# Patient Record
Sex: FEMALE | Race: WHITE | Employment: FULL TIME | URBAN - NONMETROPOLITAN AREA
[De-identification: names, ages, dates, MRNs, and addresses within clinical notes are randomized per-mention and may not be internally consistent; named-entity substitution may affect disease eponyms.]

---

## 2018-06-05 ENCOUNTER — HOSPITAL ENCOUNTER (OUTPATIENT)
Age: 28
Discharge: HOME | End: 2018-06-05
Payer: COMMERCIAL

## 2018-06-05 ENCOUNTER — HOSPITAL ENCOUNTER (OUTPATIENT)
Age: 28
End: 2018-06-05
Payer: COMMERCIAL

## 2018-06-05 LAB — RUBELLA: 302.2 IU/ML

## 2018-06-05 PROCEDURE — 86480 TB TEST CELL IMMUN MEASURE: CPT

## 2018-06-05 PROCEDURE — 36415 COLL VENOUS BLD VENIPUNCTURE: CPT

## 2018-06-05 PROCEDURE — 86765 RUBEOLA ANTIBODY: CPT

## 2018-06-05 PROCEDURE — 86706 HEP B SURFACE ANTIBODY: CPT

## 2018-06-05 PROCEDURE — 86762 RUBELLA ANTIBODY: CPT

## 2018-06-05 PROCEDURE — 86592 SYPHILIS TEST NON-TREP QUAL: CPT

## 2018-06-05 PROCEDURE — 86787 VARICELLA-ZOSTER ANTIBODY: CPT

## 2018-06-05 PROCEDURE — 86735 MUMPS ANTIBODY: CPT

## 2018-06-06 LAB
HBV SURFACE AB TITR SER: POSITIVE {TITER}
RPR: NONREACTIVE

## 2018-06-08 LAB
RUBEOLA IGM: 0.16 AU (ref 0–0.79)
VZV IGG SER QL IA: 1.94

## 2018-06-09 LAB
MUMPS IGM TITER: NORMAL
QUANTIFERON (R) TB GOLD (INCUBATED): NEGATIVE
QUANTIFERON MITOGEN MINUS NIL: > 10 IU/ML
QUANTIFERON NIL: 0.01 IU/ML
QUANTIFERON TB AG MINUS NIL: 0.05 IU/ML (ref 0–0.34)

## 2020-12-31 ENCOUNTER — HOSPITAL ENCOUNTER (OUTPATIENT)
Dept: HOSPITAL 62 - EMPHEALTH | Age: 30
End: 2020-12-31
Attending: INTERNAL MEDICINE
Payer: SELF-PAY

## 2020-12-31 DIAGNOSIS — Z23: Primary | ICD-10-CM

## 2020-12-31 PROCEDURE — 91300: CPT

## 2021-01-21 ENCOUNTER — HOSPITAL ENCOUNTER (OUTPATIENT)
Dept: HOSPITAL 62 - EMPHEALTH | Age: 31
End: 2021-01-21
Attending: INTERNAL MEDICINE
Payer: SELF-PAY

## 2021-01-21 DIAGNOSIS — Z23: Primary | ICD-10-CM

## 2021-01-21 PROCEDURE — 91300: CPT

## 2021-01-21 NOTE — XMS REPORT
Patient Summary Document

                           Created on:2021



Patient:JU CHAMPAGNE

Sex:Female

:1990

External Reference #:[id number]





Demographics







                          Address                   [address] [place] [address]

 

                          Home Phone                [phone]

 

                          Preferred Language        English

 

                          Marital Status            Unknown

 

                          Jainism Affiliation     Unknown

 

                          Race                      6-3

 

                          Ethnic Group              Unknown









Author







                          Organization              [facility]

 

                          Address                   [facility] [address]



                                                    [place] [address]

 

                          Phone                     [phone]









Care Team Providers







                    Name                Role                Phone

 

                    PCP, NONE PER PATIENT Primary Care Physician Unavailable









Allergies, Adverse Reactions, Alerts

This patient has no known allergies or adverse reactions.



Medications

This patient has no known medications.



Problems







        Condition Condition Condition Status  Onset   Resolution Last    Treatin

g Comments



        Name    Details Category         Date    Date    Treatment Clinician 



                                                        Date            

 

        Not on file Not on file [id number]                                        

 







Procedures

This patient has no known procedures.



Results

This patient has no known results.



Assessments







                Condition Name  Status          Diagnosis Date  Treating Clinici

an

 

                Diagnosis unknown Active                          







Encounters







        Start   End     Encounter Admission Attending Care    Care    Encounter 

ID



        Date/Time Date/Time Type    Type    Clinicians Facility Department 

 

        2019 Outpatient [facility]              [facility]     [id number]

[id number]



        12:25:49 23:59:00                                         [id number]



                                                                9

 

        2019 Outpatient                 [facility]  [id number]

[id number]



        12:25:49 12:40:49                                         

 

        2019 Outpatient EL              [facility]     [id number]

[id number]



        00:00:00 00:00:00                                         [id number]







Payers







             Payer Name   Policy Type  Policy Number Effective Date Expiration D

ate

 

             [facility])              [id number]    2019 00:00:00 







Plan of Treatment







                Planned Activity Planned Date    Details         Comments

 

                Future Scheduled Test                  [code = ]      

 

                Future Scheduled Test                  [code = ]      

 

                Future Scheduled Test                  [code = ]      

 

                Future Scheduled Test                  [code = ]      

 

                Future Scheduled Test                  [code = ]      

 

                Future Scheduled Test                  [code = ]      

 

                Future Scheduled Test                  [code = ]      







Social History

This patient has no known social history.



Vital Signs

This patient has no known vital signs.